# Patient Record
Sex: MALE | Race: OTHER | Employment: FULL TIME | URBAN - METROPOLITAN AREA
[De-identification: names, ages, dates, MRNs, and addresses within clinical notes are randomized per-mention and may not be internally consistent; named-entity substitution may affect disease eponyms.]

---

## 2018-03-14 ENCOUNTER — HOSPITAL ENCOUNTER (OUTPATIENT)
Age: 38
Discharge: HOME | End: 2018-03-14

## 2018-03-14 ENCOUNTER — OFFICE VISIT (OUTPATIENT)
Dept: FAMILY MEDICINE CLINIC | Age: 38
End: 2018-03-14

## 2018-03-14 VITALS
BODY MASS INDEX: 36.8 KG/M2 | SYSTOLIC BLOOD PRESSURE: 110 MMHG | RESPIRATION RATE: 16 BRPM | HEART RATE: 75 BPM | HEIGHT: 66 IN | TEMPERATURE: 96.9 F | WEIGHT: 229 LBS | OXYGEN SATURATION: 98 % | DIASTOLIC BLOOD PRESSURE: 80 MMHG

## 2018-03-14 DIAGNOSIS — R21 PENILE RASH: Primary | ICD-10-CM

## 2018-03-14 DIAGNOSIS — B37.42 CANDIDIASIS OF PENIS: ICD-10-CM

## 2018-03-14 DIAGNOSIS — R21 PENILE RASH: ICD-10-CM

## 2018-03-14 PROCEDURE — 99212 OFFICE O/P EST SF 10 MIN: CPT | Performed by: NURSE PRACTITIONER

## 2018-03-14 RX ORDER — CLOTRIMAZOLE 1 %
CREAM (GRAM) TOPICAL
Qty: 28 G | Refills: 0 | Status: SHIPPED | OUTPATIENT
Start: 2018-03-14 | End: 2018-03-21

## 2018-03-15 LAB — RPR: NORMAL

## 2018-03-17 LAB
HERPES TYPE 1/2 IGM COMBINED: 0.5 IV
HERPES TYPE I/II IGG COMBINED: 4.93 IV
HSV 1 GLYCOPROTEIN G AB IGG: 5.23 IV
HSV 2 GLYCOPROTEIN G AB IGG: 0.13 IV
SPECIMEN SOURCE: NORMAL
T. VAGINALIS AMPLIFIED: NEGATIVE

## 2018-03-19 LAB
C. TRACHOMATIS DNA ,URINE: NEGATIVE
N. GONORRHOEAE DNA, URINE: NEGATIVE

## 2018-03-29 ASSESSMENT — ENCOUNTER SYMPTOMS
SHORTNESS OF BREATH: 0
SORE THROAT: 0
COUGH: 0
RHINORRHEA: 0
EYE PAIN: 0
NAIL CHANGES: 0
DIARRHEA: 0
VOMITING: 0

## 2018-03-29 NOTE — PROGRESS NOTES
Subjective:      Patient ID: Poppy Mijares is a 41 y.o. male who presents today for:  Chief Complaint   Patient presents with    Penis Pain     started on his chaft area and now moving towards the head, said he works with [employer] at work and it gave him a rash on his neck befor that looked similar but went away, no posibility of STD      Rash   This is a new problem. The current episode started in the past 7 days. The problem has been gradually worsening since onset. The affected locations include the genitalia. The rash is characterized by redness and itchiness. It is unknown if there was an exposure to a precipitant. Pertinent negatives include no anorexia, congestion, cough, diarrhea, eye pain, facial edema, fatigue, fever, joint pain, nail changes, rhinorrhea, shortness of breath, sore throat or vomiting. Past treatments include nothing. Patient states that he had a similar rash on his neck about 2 weeks ago and it went away without intervention. Past Medical History:   Diagnosis Date    No pertinent past medical history      Current Outpatient Prescriptions on File Prior to Visit   Medication Sig Dispense Refill    predniSONE (DELTASONE) 10 MG tablet Take 3 tablets for 3 days then 2 tablets for 3 days then 1 tablet for 3 days then 1/2 for 3 days then stop orally. 20 tablet 0    triamcinolone (KENALOG) 0.025 % cream Apply topically 2 times daily. 1 Tube 0    oxyCODONE-acetaminophen (PERCOCET) 5-325 MG per tablet TK 1 T PO Q 6 H PRN P  0    nicotine (NICODERM CQ) 14 MG/24HR Place 1 patch onto the skin every 24 hours 30 patch 3     No current facility-administered medications on file prior to visit.       Past Surgical History:   Procedure Laterality Date    ANTERIOR CRUCIATE LIGAMENT REPAIR       Family History   Problem Relation Age of Onset    Heart Disease Father      Social History     Social History    Marital status:      Spouse name: N/A    Number of children: N/A    Years of

## 2024-04-21 ENCOUNTER — HOSPITAL ENCOUNTER (EMERGENCY)
Age: 44
Discharge: LEFT BEFORE DISCHARGE | End: 2024-04-21
Attending: STUDENT IN AN ORGANIZED HEALTH CARE EDUCATION/TRAINING PROGRAM

## 2024-04-21 VITALS
DIASTOLIC BLOOD PRESSURE: 101 MMHG | SYSTOLIC BLOOD PRESSURE: 149 MMHG | WEIGHT: 235 LBS | RESPIRATION RATE: 19 BRPM | HEART RATE: 107 BPM | BODY MASS INDEX: 35.61 KG/M2 | HEIGHT: 68 IN | OXYGEN SATURATION: 97 % | TEMPERATURE: 97.9 F

## 2024-04-21 DIAGNOSIS — F12.10 TETRAHYDROCANNABINOL (THC) USE DISORDER, MILD, ABUSE: ICD-10-CM

## 2024-04-21 DIAGNOSIS — F41.9 ANXIETY: Primary | ICD-10-CM

## 2024-04-21 PROCEDURE — 99283 EMERGENCY DEPT VISIT LOW MDM: CPT

## 2024-04-21 ASSESSMENT — PAIN - FUNCTIONAL ASSESSMENT: PAIN_FUNCTIONAL_ASSESSMENT: NONE - DENIES PAIN

## 2024-04-21 NOTE — ED PROVIDER NOTES
then 1 tablet for 3 days then 1/2 for 3 days then stop orally.    TRIAMCINOLONE (KENALOG) 0.025 % CREAM    Apply topically 2 times daily.       ALLERGIES     Patient has no known allergies.    FAMILY HISTORY       Family History   Problem Relation Age of Onset    Heart Disease Father           SOCIAL HISTORY       Social History     Socioeconomic History    Marital status:      Spouse name: None    Number of children: None    Years of education: None    Highest education level: None   Tobacco Use    Smoking status: Every Day     Current packs/day: 0.50     Average packs/day: 0.5 packs/day for 17.0 years (8.5 ttl pk-yrs)     Types: Cigarettes    Smokeless tobacco: Never   Substance and Sexual Activity    Alcohol use: Yes     Alcohol/week: 0.0 standard drinks of alcohol     Comment: previous history of alcohol dependency; stopped heavy drinking 6 months ago; now drinks ocassionaly     Drug use: No    Sexual activity: Yes     Partners: Female       SCREENINGS         [place] Coma Scale  Eye Opening: Spontaneous  Best Verbal Response: Oriented  Best Motor Response: Obeys commands  [place] Coma Scale Score: 15                     CIWA Assessment  BP: (!) 149/101  Pulse: (!) 107                 PHYSICAL EXAM    (up to 7 for level 4, 8 or more for level 5)     ED Triage Vitals [04/21/24 0030]   BP Temp Temp Source Pulse Respirations SpO2 Height Weight - Scale   (!) 149/101 97.9 °F (36.6 °C) Oral (!) 107 19 97 % 1.727 m (5' 8\") 106.6 kg (235 lb)       Physical Exam  Vitals and nursing note reviewed.   Constitutional:       General: He is not in acute distress.     Appearance: He is obese. He is not ill-appearing or toxic-appearing.   HENT:      Head: Normocephalic and atraumatic.   Eyes:      Extraocular Movements: Extraocular movements intact.      Pupils: Pupils are equal, round, and reactive to light.      Comments: Pupil are 7 mm to 4 mm with light   Cardiovascular:      Rate and Rhythm: Normal rate and regular

## 2024-04-21 NOTE — ED NOTES
Pt was brought to ED via EMS after using his friends weed vape pen this evening. Pt became anxious and called EMS. PT is alert and oriented.     Provider bedside to speak with pt. Provider offered further treatment and testing to pt, pt denied further treatment and walked out of triage area.